# Patient Record
Sex: MALE | Race: WHITE | NOT HISPANIC OR LATINO | Employment: OTHER | URBAN - METROPOLITAN AREA
[De-identification: names, ages, dates, MRNs, and addresses within clinical notes are randomized per-mention and may not be internally consistent; named-entity substitution may affect disease eponyms.]

---

## 2018-07-08 ENCOUNTER — HOSPITAL ENCOUNTER (EMERGENCY)
Facility: HOSPITAL | Age: 38
Discharge: HOME | End: 2018-07-08
Attending: EMERGENCY MEDICINE | Admitting: EMERGENCY MEDICINE

## 2018-07-08 ENCOUNTER — APPOINTMENT (OUTPATIENT)
Dept: GENERAL RADIOLOGY | Facility: HOSPITAL | Age: 38
End: 2018-07-08

## 2018-07-08 VITALS
DIASTOLIC BLOOD PRESSURE: 45 MMHG | HEART RATE: 69 BPM | WEIGHT: 250 LBS | SYSTOLIC BLOOD PRESSURE: 161 MMHG | RESPIRATION RATE: 18 BRPM | BODY MASS INDEX: 33.13 KG/M2 | TEMPERATURE: 97.7 F | OXYGEN SATURATION: 100 % | HEIGHT: 73 IN

## 2018-07-08 DIAGNOSIS — M54.32 SCIATICA OF LEFT SIDE: Primary | ICD-10-CM

## 2018-07-08 PROCEDURE — 72100 X-RAY EXAM L-S SPINE 2/3 VWS: CPT

## 2018-07-08 PROCEDURE — 96372 THER/PROPH/DIAG INJ SC/IM: CPT

## 2018-07-08 PROCEDURE — 99283 EMERGENCY DEPT VISIT LOW MDM: CPT

## 2018-07-08 PROCEDURE — 25010000002 KETOROLAC TROMETHAMINE PER 15 MG: Performed by: EMERGENCY MEDICINE

## 2018-07-08 RX ORDER — IBUPROFEN 800 MG/1
800 TABLET ORAL
Qty: 15 TABLET | Refills: 0 | Status: SHIPPED | OUTPATIENT
Start: 2018-07-08

## 2018-07-08 RX ORDER — DIAZEPAM 5 MG/1
10 TABLET ORAL ONCE
Status: COMPLETED | OUTPATIENT
Start: 2018-07-08 | End: 2018-07-08

## 2018-07-08 RX ORDER — LORATADINE 10 MG/1
10 CAPSULE, LIQUID FILLED ORAL DAILY
COMMUNITY

## 2018-07-08 RX ORDER — KETOROLAC TROMETHAMINE 30 MG/ML
60 INJECTION, SOLUTION INTRAMUSCULAR; INTRAVENOUS ONCE
Status: COMPLETED | OUTPATIENT
Start: 2018-07-08 | End: 2018-07-08

## 2018-07-08 RX ORDER — CYCLOBENZAPRINE HCL 10 MG
10 TABLET ORAL 3 TIMES DAILY PRN
Qty: 15 TABLET | Refills: 0 | Status: SHIPPED | OUTPATIENT
Start: 2018-07-08

## 2018-07-08 RX ADMIN — KETOROLAC TROMETHAMINE 60 MG: 30 INJECTION, SOLUTION INTRAMUSCULAR at 02:07

## 2018-07-08 RX ADMIN — DIAZEPAM 10 MG: 5 TABLET ORAL at 02:07

## 2018-07-08 NOTE — ED PROVIDER NOTES
Subjective   Jorge Vasquez is a 37 yr old male that presents to the ER with complaints of lower back pain.  Pt explains that he was using a chainsaw to cut a tree off a fence he twisted the wrong way and injured his lower back. Pt c/o pain that is on the left lower back and radiates into his lt lower leg.  Pt denies any numbness, tingling of extremities.  Patient denies any loss of bowel or bladder function.  He reports that he's had a problem with his lower back off and on for yrs.         Back Pain   Location:  Lumbar spine and sacro-iliac joint  Quality:  Shooting and stabbing  Radiates to:  L posterior upper leg  Pain severity:  Moderate  Duration:  1 day  Timing:  Constant  Progression:  Worsening  Context: twisting    Relieved by:  Nothing  Worsened by:  Movement, twisting, palpation and ambulation  Associated symptoms: no bladder incontinence, no bowel incontinence, no numbness, no tingling and no weakness        Review of Systems   Gastrointestinal: Negative for bowel incontinence.   Genitourinary: Negative for bladder incontinence.   Musculoskeletal: Positive for back pain.   Neurological: Negative for tingling, weakness and numbness.   All other systems reviewed and are negative.      Past Medical History:   Diagnosis Date   • H/O seasonal allergies    • Injury of back        No Known Allergies    History reviewed. No pertinent surgical history.    History reviewed. No pertinent family history.    Social History     Social History   • Marital status: Single     Social History Main Topics   • Smoking status: Current Some Day Smoker      Comment: cigarette a week   • Alcohol use No   • Drug use: No     Other Topics Concern   • Not on file           Objective   Physical Exam   Constitutional: He is oriented to person, place, and time. He appears well-developed and well-nourished.   HENT:   Head: Normocephalic and atraumatic.   Eyes: Conjunctivae and EOM are normal.   Neck: Normal range of motion. No spinous  process tenderness and no muscular tenderness present. Normal range of motion present.   Cardiovascular: Normal rate, regular rhythm, normal heart sounds and intact distal pulses.    Pulmonary/Chest: Effort normal and breath sounds normal. No respiratory distress.   Abdominal: Soft. Bowel sounds are normal. He exhibits no distension. There is no tenderness.   Musculoskeletal:        Cervical back: Normal. He exhibits normal range of motion, no tenderness and no bony tenderness.        Thoracic back: Normal. He exhibits normal range of motion, no tenderness and no bony tenderness.        Lumbar back: He exhibits tenderness (paraspinal tenderness, tenderness LT SI joint). He exhibits normal range of motion, no bony tenderness and normal pulse.   Neurological: He is alert and oriented to person, place, and time. No cranial nerve deficit.   Skin: Skin is warm and dry.   Psychiatric: He has a normal mood and affect.   Nursing note and vitals reviewed.      Procedures           ED Course  ED Course as of Jul 08 0408   Sun Jul 08, 2018   0406 Pt is advise her results at this time.  Patient will be discharged home with Motrin, Flexeril.  Patient encouraged to follow with primary care physician.  Patient advises the pain continues he will need to have an MRI of his lower spine.  Patient encouraged to return if he has any loss of bowel or bladder function or any saddle anesthesia.  Patient agrees and verbalizes understanding.  [KG]      ED Course User Index  [KG] KRISTINE Brito        No results found for this or any previous visit (from the past 24 hour(s)).  Note: In addition to lab results from this visit, the labs listed above may include labs taken at another facility or during a different encounter within the last 24 hours. Please correlate lab times with ED admission and discharge times for further clarification of the services performed during this visit.    XR Spine Lumbar 2 or 3 View   Final Result      No  acute abnormality demonstrated.      Nonacute findings as noted.      THIS DOCUMENT HAS BEEN ELECTRONICALLY SIGNED BY MEKHI HEART MD        Vitals:    07/08/18 0249 07/08/18 0330 07/08/18 0332 07/08/18 0336   BP:  161/45     BP Location:       Patient Position:       Pulse: 72   69   Resp: 16   18   Temp:       TempSrc:       SpO2:  92% 100%    Weight:       Height:         Medications   ketorolac (TORADOL) injection 60 mg (60 mg Intramuscular Given 7/8/18 0207)   diazePAM (VALIUM) tablet 10 mg (10 mg Oral Given 7/8/18 0207)     ECG/EMG Results (last 24 hours)     ** No results found for the last 24 hours. **                  [facility]      Final diagnoses:   Sciatica of left side            Ayde Silva, KRISTINE  07/08/18 0408